# Patient Record
Sex: FEMALE | Race: WHITE
[De-identification: names, ages, dates, MRNs, and addresses within clinical notes are randomized per-mention and may not be internally consistent; named-entity substitution may affect disease eponyms.]

---

## 2017-03-20 ENCOUNTER — HOSPITAL ENCOUNTER (OUTPATIENT)
Dept: HOSPITAL 45 - C.LABSPEC | Age: 27
Discharge: HOME | End: 2017-03-20
Attending: OBSTETRICS & GYNECOLOGY
Payer: COMMERCIAL

## 2017-03-20 DIAGNOSIS — O09.299: Primary | ICD-10-CM

## 2017-03-20 LAB
APPEARANCE UR: CLEAR
BILIRUB UR-MCNC: (no result) MG/DL
COLOR UR: YELLOW
MANUAL MICROSCOPIC REQUIRED?: NO
NITRITE UR QL STRIP: (no result)
PH UR STRIP: 6.5 [PH] (ref 4.5–7.5)
REVIEW REQ?: NO
SP GR UR STRIP: 1.02 (ref 1–1.03)
URINE BILL WITH OR WITHOUT MIC: (no result)
UROBILINOGEN UR-MCNC: (no result) MG/DL

## 2017-03-28 ENCOUNTER — HOSPITAL ENCOUNTER (OUTPATIENT)
Dept: HOSPITAL 45 - C.LABSPEC | Age: 27
Discharge: HOME | End: 2017-03-28
Attending: OBSTETRICS & GYNECOLOGY
Payer: COMMERCIAL

## 2017-03-28 ENCOUNTER — HOSPITAL ENCOUNTER (OUTPATIENT)
Dept: HOSPITAL 45 - C.LAB1850 | Age: 27
Discharge: HOME | End: 2017-03-28
Attending: OBSTETRICS & GYNECOLOGY
Payer: COMMERCIAL

## 2017-03-28 DIAGNOSIS — Z34.00: Primary | ICD-10-CM

## 2017-03-28 LAB
BASOPHILS # BLD: 0.02 K/UL (ref 0–0.2)
BASOPHILS NFR BLD: 0.3 %
COMPLETE: YES
EOSINOPHIL NFR BLD AUTO: 115 K/UL (ref 130–400)
HCT VFR BLD CALC: 38.3 % (ref 37–47)
IG%: 0.2 %
IMM GRANULOCYTES NFR BLD AUTO: 25.6 %
LG PLATELETS BLD QL SMEAR: (no result)
LYMPHOCYTES # BLD: 1.6 K/UL (ref 1.2–3.4)
MCH RBC QN AUTO: 30.5 PG (ref 25–34)
MCHC RBC AUTO-ENTMCNC: 34.7 G/DL (ref 32–36)
MCV RBC AUTO: 87.8 FL (ref 80–100)
MONOCYTES NFR BLD: 6.7 %
NEUTROPHILS # BLD AUTO: 0.5 %
NEUTROPHILS NFR BLD AUTO: 66.7 %
PLATELET # BLD EST: (no result) 10*3/UL
PMV BLD AUTO: 12.8 FL (ref 7.4–10.4)
RBC # BLD AUTO: 4.36 M/UL (ref 4.2–5.4)
WBC # BLD AUTO: 6.26 K/UL (ref 4.8–10.8)

## 2017-03-31 LAB
CHLAMYDIA TRACH RNA***: NOT DETECTED
GC (NEIS GONORRHOEAE)RNA**: NOT DETECTED

## 2017-06-01 ENCOUNTER — HOSPITAL ENCOUNTER (OUTPATIENT)
Dept: HOSPITAL 45 - C.LAB1850 | Age: 27
Discharge: HOME | End: 2017-06-01
Attending: OBSTETRICS & GYNECOLOGY
Payer: COMMERCIAL

## 2017-06-01 DIAGNOSIS — O09.299: Primary | ICD-10-CM

## 2017-06-01 LAB — GTGD: 50 GRAMS

## 2017-08-17 ENCOUNTER — HOSPITAL ENCOUNTER (OUTPATIENT)
Dept: HOSPITAL 45 - C.LAB1850 | Age: 27
Discharge: HOME | End: 2017-08-17
Attending: OBSTETRICS & GYNECOLOGY
Payer: COMMERCIAL

## 2017-08-17 DIAGNOSIS — Z34.03: Primary | ICD-10-CM

## 2017-08-17 LAB
APPEARANCE UR: CLEAR
BILIRUB UR-MCNC: (no result) MG/DL
COLOR UR: YELLOW
GTGD: 50 GRAMS
HCT VFR BLD CALC: 33.5 % (ref 37–47)
MANUAL MICROSCOPIC REQUIRED?: NO
NITRITE UR QL STRIP: (no result)
PH UR STRIP: 6 [PH] (ref 4.5–7.5)
REVIEW REQ?: NO
SP GR UR STRIP: 1.02 (ref 1–1.03)
URINE EPITHELIAL CELL AUTO: (no result) /LPF (ref 0–5)
UROBILINOGEN UR-MCNC: (no result) MG/DL

## 2017-10-12 ENCOUNTER — HOSPITAL ENCOUNTER (OUTPATIENT)
Dept: HOSPITAL 45 - C.LABSPEC | Age: 27
Discharge: HOME | End: 2017-10-12
Attending: OBSTETRICS & GYNECOLOGY
Payer: COMMERCIAL

## 2017-10-12 DIAGNOSIS — Z34.03: Primary | ICD-10-CM

## 2017-10-12 DIAGNOSIS — Z3A.00: ICD-10-CM

## 2017-11-07 ENCOUNTER — HOSPITAL ENCOUNTER (INPATIENT)
Dept: HOSPITAL 45 - C.LD | Age: 27
LOS: 9 days | Discharge: HOME | End: 2017-11-16
Attending: OBSTETRICS & GYNECOLOGY | Admitting: OBSTETRICS & GYNECOLOGY
Payer: COMMERCIAL

## 2017-11-07 VITALS
HEIGHT: 62.99 IN | BODY MASS INDEX: 29.88 KG/M2 | WEIGHT: 168.65 LBS | WEIGHT: 168.65 LBS | HEIGHT: 62.99 IN | BODY MASS INDEX: 29.88 KG/M2

## 2017-11-07 DIAGNOSIS — O48.0: Primary | ICD-10-CM

## 2017-11-07 DIAGNOSIS — Z3A.41: ICD-10-CM

## 2017-11-14 VITALS
DIASTOLIC BLOOD PRESSURE: 73 MMHG | HEART RATE: 68 BPM | SYSTOLIC BLOOD PRESSURE: 110 MMHG | TEMPERATURE: 98.24 F | OXYGEN SATURATION: 98 %

## 2017-11-14 VITALS — HEART RATE: 61 BPM | TEMPERATURE: 98.24 F | SYSTOLIC BLOOD PRESSURE: 114 MMHG | DIASTOLIC BLOOD PRESSURE: 75 MMHG

## 2017-11-14 VITALS — HEART RATE: 93 BPM | SYSTOLIC BLOOD PRESSURE: 131 MMHG | TEMPERATURE: 98.06 F | DIASTOLIC BLOOD PRESSURE: 72 MMHG

## 2017-11-14 LAB
EOSINOPHIL NFR BLD AUTO: 83 K/UL (ref 130–400)
HCT VFR BLD CALC: 36.4 % (ref 37–47)
MCH RBC QN AUTO: 31.8 PG (ref 25–34)
MCHC RBC AUTO-ENTMCNC: 34.1 G/DL (ref 32–36)
MCV RBC AUTO: 93.3 FL (ref 80–100)
PLATELET # BLD EST: (no result) 10*3/UL
PMV BLD AUTO: 12.7 FL (ref 7.4–10.4)
RBC # BLD AUTO: 3.9 M/UL (ref 4.2–5.4)
WBC # BLD AUTO: 9.03 K/UL (ref 4.8–10.8)

## 2017-11-14 PROCEDURE — 10907ZC DRAINAGE OF AMNIOTIC FLUID, THERAPEUTIC FROM PRODUCTS OF CONCEPTION, VIA NATURAL OR ARTIFICIAL OPENING: ICD-10-PCS | Performed by: OBSTETRICS & GYNECOLOGY

## 2017-11-14 RX ADMIN — DOCUSATE SODIUM SCH MG: 100 CAPSULE, LIQUID FILLED ORAL at 19:52

## 2017-11-14 RX ADMIN — Medication PRN MG: at 23:53

## 2017-11-14 NOTE — DELIVERY SUMMARY
DATE OF OPERATION:  11/14/2017

 

This patient is a 27-year-old nulliparous white female who presented for

postdate induction.  She was 4 to 5 cm dilated on arrival.  Membranes were

ruptured for clear fluid.  She then progressed spontaneously to full

dilation.  She had requested epidural analgesia however platelet counts was

83,000.  She then proceeded to push effectively over intact perineum.  A

median episiotomy was done to assist in delivering the head because of

maternal exhaustion.  The rest of the infant delivered easily.  Mouth and

nasopharynx were suctioned on the perineum.  The rest of the infant after

delivery was placed on mother's abdomen for further attention and

stimulation.  The infant was moving all 4 limbs and was crying spontaneously.

 Cord was clamped and cut.  After cord blood was obtained, the placenta was

expressed intact with a 3-vessel cord.  Postpartum bleeding was controlled

with dilute Pitocin and fundal massage.  A second-degree perineal laceration

was repaired in the usual fashion with 3-0 and 2-0 chromic.  Estimated blood

loss was 400 mL.  Mother and infant were doing well after delivery.

 

 

I attest to the content of the Intraoperative Record and any orders documented therein. Any exception
s are noted below.

## 2017-11-15 VITALS
OXYGEN SATURATION: 98 % | HEART RATE: 57 BPM | SYSTOLIC BLOOD PRESSURE: 108 MMHG | TEMPERATURE: 97.7 F | DIASTOLIC BLOOD PRESSURE: 71 MMHG

## 2017-11-15 VITALS
TEMPERATURE: 98.06 F | HEART RATE: 70 BPM | OXYGEN SATURATION: 98 % | DIASTOLIC BLOOD PRESSURE: 66 MMHG | SYSTOLIC BLOOD PRESSURE: 111 MMHG

## 2017-11-15 VITALS
DIASTOLIC BLOOD PRESSURE: 65 MMHG | OXYGEN SATURATION: 97 % | TEMPERATURE: 98.06 F | HEART RATE: 76 BPM | SYSTOLIC BLOOD PRESSURE: 110 MMHG

## 2017-11-15 VITALS
HEART RATE: 68 BPM | SYSTOLIC BLOOD PRESSURE: 111 MMHG | OXYGEN SATURATION: 98 % | DIASTOLIC BLOOD PRESSURE: 68 MMHG | TEMPERATURE: 97.88 F

## 2017-11-15 VITALS
HEART RATE: 64 BPM | DIASTOLIC BLOOD PRESSURE: 72 MMHG | TEMPERATURE: 98.06 F | OXYGEN SATURATION: 99 % | SYSTOLIC BLOOD PRESSURE: 107 MMHG

## 2017-11-15 VITALS — OXYGEN SATURATION: 98 %

## 2017-11-15 LAB
EOSINOPHIL NFR BLD AUTO: 77 K/UL (ref 130–400)
HCT VFR BLD CALC: 30.3 % (ref 37–47)
MCH RBC QN AUTO: 32.2 PG (ref 25–34)
MCHC RBC AUTO-ENTMCNC: 34.3 G/DL (ref 32–36)
MCV RBC AUTO: 93.8 FL (ref 80–100)
PMV BLD AUTO: 12.6 FL (ref 7.4–10.4)
RBC # BLD AUTO: 3.23 M/UL (ref 4.2–5.4)
WBC # BLD AUTO: 14.43 K/UL (ref 4.8–10.8)

## 2017-11-15 RX ADMIN — DOCUSATE SODIUM SCH MG: 100 CAPSULE, LIQUID FILLED ORAL at 19:51

## 2017-11-15 RX ADMIN — DOCUSATE SODIUM SCH MG: 100 CAPSULE, LIQUID FILLED ORAL at 08:41

## 2017-11-15 RX ADMIN — Medication SCH TAB: at 08:41

## 2017-11-15 RX ADMIN — Medication PRN MG: at 19:53

## 2017-11-15 NOTE — OB/GYN PROGRESS NOTE
OB/GYN Progress Note


Date of Service


Nov 15, 2017.





Subjective


conversation w/ patient, physical exam, chart review, lab review


Ambulation:  ambulating normally


Voiding:  no voiding problems


Passing Gas:  Yes


Diet Tolerance:  Regular Diet


Lochia:  Moderate


Feeding Type:  Breast Feeding


Pain:  2/10 pain with movement





Review of Systems


Constitutional:  No fever, No chills


Respiratory:  No shortness of breath


Cardiac:  No chest pain


Abdomen:  No nausea, No vomiting


Female :  No dysuria





Objective


Vital Signs











  Date Time  Temp Pulse Resp B/P (MAP) Pulse Ox O2 Delivery O2 Flow Rate FiO2


 


11/15/17 03:40 36.7 70 16 111/66 (81) 98 Room Air  


 


17 23:30      Room Air  


 


17 23:30 36.8 68 18 110/73 (85) 98 Room Air  


 


17 20:20 36.8 61 16 114/75 (88)  Room Air  


 


17 20:20      Room Air  


 


17 17:00      Room Air  


 


17 17:00 36.7 93 18 131/72 (91)  Room Air  











Physical Exam


General Appearance:  WELL-APPEARING


Respiratory/Chest:  lungs clear, normal breath sounds


Cardiovascular:  regular rate, rhythm


Abdomen:  normal bowel sounds, non tender, soft


Fundus:  Firm, Relation to Umbilicus (1 FB below)


Extremities:  non-tender, no pedal edema





Laboratory Results





Last 24 Hours








Test


  17


08:44 11/15/17


04:44


 


White Blood Count 9.03 K/uL  


 


Red Blood Count 3.90 M/uL  


 


Hemoglobin 12.4 g/dL  


 


Hematocrit 36.4 %  


 


Mean Corpuscular Volume 93.3 fL  


 


Mean Corpuscular Hemoglobin 31.8 pg  


 


Mean Corpuscular Hemoglobin


Concent 34.1 g/dl 


  


 


 


RDW Standard Deviation 45.6 fL  


 


RDW Coefficient of Variation 13.3 %  


 


Platelet Count 83 K/uL  


 


Mean Platelet Volume 12.7 fL  


 


Platelet Estimate DECREASED  











Assessment and Plan


Day Number:  1


Continue Routine Care:


Resident Physician Supervision Note:





I interviewed and examined the patient. Discussed with Dr. Isbell and agree with 

findings and plan as documented in the note. Any exceptions or clarifications 

are listed here: [None]





Documented By:  Diana Gibson


A/P:





This is a 28 y/o female, , s/p [normal vaginal delivery] day 1. She is 

ambulating and clinically stable.





Plan:


- Vitals signs are reviewed and WNL (Tmax 36.8 )


- Last Hgb is 12.4. This AM is pending


- Blood type B+, GBS neg, Rubella Immune


- Routine postpartum care 


- Encourage ambulation, monitor and control pain with medication as needed, 

continue with regular diet as tolerated and monitor lochia


- Stool softeners and sitz bath recommended


- Encourage breast feeding and educate about breast feeding





Resident Involvement:  Resident Care Provided


Care Provided:  OB Delivery

## 2017-11-16 VITALS
DIASTOLIC BLOOD PRESSURE: 78 MMHG | TEMPERATURE: 98.24 F | OXYGEN SATURATION: 98 % | HEART RATE: 91 BPM | SYSTOLIC BLOOD PRESSURE: 112 MMHG

## 2017-11-16 VITALS — HEART RATE: 91 BPM | DIASTOLIC BLOOD PRESSURE: 78 MMHG | TEMPERATURE: 98.24 F

## 2017-11-16 RX ADMIN — Medication PRN MG: at 07:49

## 2017-11-16 RX ADMIN — Medication SCH TAB: at 07:48

## 2017-11-16 RX ADMIN — DOCUSATE SODIUM SCH MG: 100 CAPSULE, LIQUID FILLED ORAL at 07:48

## 2017-11-16 NOTE — OB/GYN PROGRESS NOTE
OB/GYN Progress Note


Date of Service


2017.





Subjective


conversation w/ patient (feeling better than yesterday, bleeding improved, pain 

mainly from hemorrhoids), physical exam, chart review, lab review


Ambulation:  ambulating normally


Voiding:  no voiding problems


Passing Gas:  Yes


Diet Tolerance:  Regular Diet


Lochia:  Small


Feeding Type:  Breast Feeding


Pain:  3/10 pain





Review of Systems


Constitutional:  No fever, No chills


Respiratory:  No shortness of breath


Cardiac:  No chest pain


Abdomen:  No nausea, No vomiting


Female :  No dysuria





Objective


Vital Signs











  Date Time  Temp Pulse Resp B/P (MAP) Pulse Ox O2 Delivery O2 Flow Rate FiO2


 


11/15/17 23:40     97 Room Air  


 


11/15/17 23:40 36.7 76 18 110/65 (80) 97 Room Air  


 


11/15/17 15:36 36.7 64 16 107/72 (84) 99 Room Air  


 


11/15/17 15:10      Room Air  


 


11/15/17 11:50 36.6 68 16 111/68 (82) 98 Room Air  


 


11/15/17 09:17     98 Room Air  


 


11/15/17 07:30 36.5 57 12 108/71 (83) 98 Room Air  











Physical Exam


General Appearance:  WELL-APPEARING


Respiratory/Chest:  lungs clear, normal breath sounds


Cardiovascular:  regular rate, rhythm


Abdomen:  normal bowel sounds, non tender, soft


Fundus:  Firm, Relation to Umbilicus (2 FB below)


Extremities:  non-tender, no pedal edema





Laboratory Results





Last 24 Hours








Test


  11/15/17


07:00


 


White Blood Count 14.43 K/uL 


 


Red Blood Count 3.23 M/uL 


 


Hemoglobin 10.4 g/dL 


 


Hematocrit 30.3 % 


 


Mean Corpuscular Volume 93.8 fL 


 


Mean Corpuscular Hemoglobin 32.2 pg 


 


Mean Corpuscular Hemoglobin


Concent 34.3 g/dl 


 


 


RDW Standard Deviation 46.3 fL 


 


RDW Coefficient of Variation 13.4 % 


 


Platelet Count 77 K/uL 


 


Mean Platelet Volume 12.6 fL 











Assessment and Plan


Day Number:  2


Continue Routine Care:


A/P:





This is a 28 y/o female, , s/p [normal vaginal delivery] day 2. She is 

ambulating and clinically stable for discharge with no active signs of bleeding 

with post-delivery Hgb of 10.4 despite low repeat platelet count of 77 which 

appears to be chronic (pre-delivery 83 and 115 on prenatal labs). 





Plan:


- Vitals signs are reviewed and WNL (Tmax 36.8 )


- Last Hgb is 10.4


- Blood type B+, GBS neg, Rubella Immune


- No signs of postpartum depression.


- Routine postpartum care


- Discussed resting, feeding, pain control, mastitis, birth control, follow up 

in 6 weeks and reasons to call sooner, if necessary.


- Continue with pain medication as needed, and continue prenatal vitamins.


- Encourage breast feeding and educate about breast feeding


- Patient understands and keen for home.


- Plan to discharge home





Resident Physician Supervision Note:





I interviewed and examined the patient. Discussed with Dr. Isbell and agree with 

findings and plan as documented in the note. Any exceptions or clarifications 

are listed here: [None]





Documented By:  Too Gill








Resident Involvement:  Resident Care Provided


Care Provided:  OB Delivery

## 2017-11-16 NOTE — DISCHARGE INSTRUCTIONS
Discharge Instructions


Date of Service


Nov 16, 2017.





Admission


Reason for Admission:  Induction





Discharge


Discharge Diagnosis / Problem:  after vaginal delivery





Discharge Goals


Goal(s):  Routine recovery after delivery





Medications


Continue Dispensed Medications:  supercream, dermaplast, tucks, lansinoh





Activity Recommendations


Activity Limitations:  per Instructions/Follow-up section





.





Instructions / Follow-Up


Instructions / Follow-Up





ACTIVITY RECOMMENDATIONS:





* Gradual return to full activity over the next 2-3 weeks.


* No lifting - nothing heavier than baby over the next 2-3 weeks.


* Do not engage in vigorous exercise, sexual activity or sports until cleared by


   your physician.


* Do not drive or operate any motorized equipment until cleared by your 

physician.


* You may shower/bathe daily.








MEDICATIONS:





For discomfort or pain, you may use Acetaminophen (Tylenol), Ibuprofen (Advil),


or Naproxen (Aleve) following the package directions. For constipation you may 


use Colace following the package directions.








BREAST CARE:





If you are not breast feeding:





*  Wear a supportive bra 24 hours a day for one to two weeks.


*  Avoid stimulating your breasts and nipples as much as possible during the 

first 


    few weeks after delivery.


*  When taking a shower, have the warm water hit your back, not breasts.


*  When your breasts feel full, apply ice packs.  Usually three to four times a 

day


    helps ease the discomfort.


*  Take a mild pain medication (Tylenol / Motrin) when you are uncomfortable.





If breast feeding:





*  Use breast milk to lubricate nipples.  Lansinoh cream may be used for sore 

nipples. 


    You do not need to remove cream prior to breast feeding.  If using a 

different brand


   of cream, check the label for directions regarding removal of cream prior to 

nursing.


*  Wear a supportive bra.


*  If having problems with breasts or breast feeding, call a lactation 

consultant 


    or your health care provider.








EPISIOTOMY CARE:





After delivery, if you have an episiotomy (stitches), the following steps will 

ease


discomfort and aid healing.





*  For the first 24 hours after delivery, place ice packs next to your 

episiotomy to


   help reduce swelling.


*  After the first 24 hour-period, sitz baths, either portable or in the tub, 

are suggested.


    A shower with a shower arm sprayed over the episiotomy may be comforting.


*  Kristine care should be done after each voiding and bowel movement.  Squirt warm 

water


    from a plastic bottle over the perineum (region of the body between the 

anus and 


    urinary opening) and pat dry.


*  Use Dermoplast to ease discomfort.  Shake container.  Spray directly over 

the 


    episiotomy.  Place a Tucks on a clean sanitary pad next to your episiotomy.








SPECIAL CARE INSTRUCTIONS:





When you are discharged from the hospital, it is important for you to follow 

the instructions 


listed below:





*  During the first week at home, you should be able to care for yourself and 

your baby.


    In addition, the usual light household activities are encouraged.





*  Limit your activities to the way you feel.  Do not try to clean the house or 

move 


    furniture. Be sensible.





*  If you actively engage in sports and have done so up until the time of your 

delivery, 


    you may resume these activities as soon as you feel able.  This may take up 

to one 


    month or even longer.  Use good judgment.





*  Continue to take your prenatal vitamins for at least six weeks after the 

birth of your baby.





*  Your diet need not be limited unless you were on a special diet before your 

delivery.  


    Breast-feeding mothers need around 2500 calories per day and at least 64-80 

ounces of 


    fluid per day (8 to 10 glasses).





*  You should eat foods from the four major food groups.  Crash diets or fad 

diets are to be 


    avoided.  Eating lean meats, fresh fruits and vegetables, low-fat dairy 

products, high fiber


    foods and a regular exercise program, will help you get back to your pre-

pregnancy weight


    without putting your health at risk.





*  Constipation is sometimes a problem after delivery.  Take a mild laxative as 

needed.  If 


    breast feeding, Milk of Magnesia is acceptable to use. You may use a 

suppository or Fleets


    enema if no episiotomy.





*  A daily shower or tub bath is suggested.  Be sure to thoroughly and gently 

dry the perineum.





*  A bloody vaginal discharge will usually continue until around four weeks 

post partum.  A 


    small amount of bleeding may continue for as long as six weeks.  Vaginal 

discharge changes


    from the bright red bleeding after delivery to pink then brownish and 

finally yellowish-pink 


    before becoming white and disappearing.





*  Bleeding may increase with activity.  Your first period may come in 4-8 

weeks.  If you are 


    breast feeding, your period may be delayed even longer.





*  [place] (sex) can begin whenever both you and your partner feel 

comfortable and do 


    not have any form of genital infection.  It is recommended that you wait at 

least six weeks


    for internal and external healing to occur.  If you have questions, please 

talk to your health


    care practitioner.  A condom should be used to prevent infection and 

pregnancy.





*  Foreplay, gentle intercourse and lubrication is very important the first 

several times to 


    prevent pain.  A water-based lubricant such as K-Y jelly or Astroglide may 

be used.





*  If you have RH negative blood and your baby is RH positive, you will receive 

RHOGAM by 


    injection prior to discharge.  The nurse will give you a card to keep with 

you that has the


    date and place that you received RHOGAM after delivery.





*  During your prenatal care, you had a Rubella screen done to check for the 

presence of 


    rubella antibodies in your blood.  If your test was negative, you will 

receive a Rubella 


    vaccine prior to discharge.  This vaccine may cause a fever, soreness at 

the injection site


    and flu-like symptoms.  If these symptoms persist, notify your health care 

practitioner.  


    Pregnancy is not advised for one month after a Rubella vaccine.





*  Verbalizes understanding of car seat law as reviewed with patient nursing.





*  Car Seat hand-out given and reviewed with patient by nursing.





*  Shaken baby information reviewed with patient by nursing.


 


Call you doctor if:





*  Heavy bleeding (saturating several pads an hour) or passing clots the size 

of your fist.


*  A fever >101 degrees F (38.3 degrees C) on two occasions four hours apart and

/or chills.


*  Unusual pain in the pelvic or vaginal areas.


* "Baby Blues" lasting longer than two weeks.





If you have any questions or concerns, call your health care practitioner at 


[phone].








FOLLOW UP VISIT:





*  Please call the office at [phone] to schedule a 6 week postpartum 


    examination.  It is important you keep this appointment.  It is important 


    for you to make arrangements for either yearly or twice yearly check-ups 


    thereafter.





Current Hospital Diet


Patient's current hospital diet: Regular OB Diet





Discharge Diet


Recommended Diet:  Regular Diet





Pending Studies


Studies pending at discharge:  no





Medical Emergencies








.


Who to Call and When:





Medical Emergencies:  If at any time you feel your situation is an emergency, 

please call 911 immediately.





.





Non-Emergent Contact


Non-Emergency issues call your:  Primary Care Provider, Gynecologist


Call Non-Emergent contact if:  temperature is above 101, your pain is not 

controlled





.


.








"Provider Documentation" section prepared by Brina Isbell.








.





VTE Core Measure


Inpt VTE Proph given/why not?:  Treatment not indicated

## 2018-01-05 ENCOUNTER — HOSPITAL ENCOUNTER (OUTPATIENT)
Dept: HOSPITAL 45 - C.PAPS | Age: 28
Discharge: HOME | End: 2018-01-05
Attending: OBSTETRICS & GYNECOLOGY
Payer: COMMERCIAL